# Patient Record
Sex: MALE | URBAN - METROPOLITAN AREA
[De-identification: names, ages, dates, MRNs, and addresses within clinical notes are randomized per-mention and may not be internally consistent; named-entity substitution may affect disease eponyms.]

---

## 2020-10-05 ENCOUNTER — OFFICE VISIT (OUTPATIENT)
Dept: URBAN - METROPOLITAN AREA CLINIC 76 | Facility: CLINIC | Age: 80
End: 2020-10-05
Payer: COMMERCIAL

## 2020-10-05 DIAGNOSIS — H26.493 OTHER SECONDARY CATARACT, BILATERAL: Primary | ICD-10-CM

## 2020-10-05 PROCEDURE — 99204 OFFICE O/P NEW MOD 45 MIN: CPT | Performed by: OPTOMETRIST

## 2020-10-05 ASSESSMENT — KERATOMETRY
OS: 46.75
OD: 46.38

## 2020-10-05 ASSESSMENT — INTRAOCULAR PRESSURE
OD: 11
OS: 11

## 2020-10-05 ASSESSMENT — VISUAL ACUITY
OD: 20/40
OS: 20/50

## 2020-10-05 NOTE — IMPRESSION/PLAN
Impression: Other secondary cataract, bilateral: H26.493. Bilateral. OS>OD Plan: Discussed diagnosis with patient and treatment options. Recommend YAG consult with Dr. Loren Vang.

## 2020-10-30 ENCOUNTER — OFFICE VISIT (OUTPATIENT)
Dept: URBAN - METROPOLITAN AREA CLINIC 76 | Facility: CLINIC | Age: 80
End: 2020-10-30
Payer: COMMERCIAL

## 2020-10-30 DIAGNOSIS — Z96.1 PRESENCE OF INTRAOCULAR LENS: ICD-10-CM

## 2020-10-30 DIAGNOSIS — H52.4 PRESBYOPIA: ICD-10-CM

## 2020-10-30 PROCEDURE — 92002 INTRM OPH EXAM NEW PATIENT: CPT | Performed by: OPHTHALMOLOGY

## 2020-10-30 ASSESSMENT — INTRAOCULAR PRESSURE
OD: 11
OS: 11

## 2020-10-30 ASSESSMENT — KERATOMETRY
OS: 47.00
OD: 46.25

## 2020-10-30 NOTE — IMPRESSION/PLAN
Impression: Other secondary cataract, bilateral: H26.493. Plan: Discussed diagnosis in detail with patient. Discussed treatment options with patient. R/B/A of yag cap discussed, pt would like to proceed. Yag OS then OD ordered. RL2.

## 2020-12-17 ENCOUNTER — SURGERY (OUTPATIENT)
Dept: URBAN - METROPOLITAN AREA SURGERY 47 | Facility: SURGERY | Age: 80
End: 2020-12-17
Payer: COMMERCIAL

## 2020-12-17 PROCEDURE — 66821 AFTER CATARACT LASER SURGERY: CPT | Performed by: OPHTHALMOLOGY

## 2020-12-23 ENCOUNTER — OFFICE VISIT (OUTPATIENT)
Dept: URBAN - METROPOLITAN AREA CLINIC 76 | Facility: CLINIC | Age: 80
End: 2020-12-23
Payer: COMMERCIAL

## 2020-12-23 DIAGNOSIS — Z48.810 ENCOUNTER FOR SURGICAL AFTERCARE FOLLOWING SURGERY ON A SENSE ORGAN: Primary | ICD-10-CM

## 2020-12-23 PROCEDURE — 99024 POSTOP FOLLOW-UP VISIT: CPT | Performed by: OPTOMETRIST

## 2020-12-23 ASSESSMENT — VISUAL ACUITY
OD: 20/40
OS: 20/20

## 2020-12-23 ASSESSMENT — INTRAOCULAR PRESSURE
OD: 11
OS: 11

## 2020-12-23 NOTE — IMPRESSION/PLAN
Impression: S/P YAG Capsulotomy (Yttrium Aluminum [place]) OS - 6 Days. Encounter for surgical aftercare following surgery on a sense organ  Z48.810. Excellent post op course   Post operative instructions reviewed - Plan: Discussed with patient. Hold off on new mrx until YAG OD 12/30. Continue AT's QID OU.

## 2020-12-30 ENCOUNTER — SURGERY (OUTPATIENT)
Dept: URBAN - METROPOLITAN AREA SURGERY 47 | Facility: SURGERY | Age: 80
End: 2020-12-30
Payer: COMMERCIAL

## 2020-12-30 DIAGNOSIS — H26.491 OTHER SECONDARY CATARACT, RIGHT EYE: Primary | ICD-10-CM

## 2020-12-30 PROCEDURE — 66821 AFTER CATARACT LASER SURGERY: CPT | Performed by: OPHTHALMOLOGY

## 2021-01-11 ENCOUNTER — POST-OPERATIVE VISIT (OUTPATIENT)
Dept: URBAN - METROPOLITAN AREA CLINIC 76 | Facility: CLINIC | Age: 81
End: 2021-01-11
Payer: COMMERCIAL

## 2021-01-11 PROCEDURE — 99024 POSTOP FOLLOW-UP VISIT: CPT | Performed by: OPTOMETRIST

## 2021-01-11 ASSESSMENT — VISUAL ACUITY
OS: 20/20-
OD: 20/30--

## 2021-01-11 ASSESSMENT — INTRAOCULAR PRESSURE
OS: 11
OD: 12

## 2021-01-11 NOTE — IMPRESSION/PLAN
Impression: S/P YAG - posterior capsulotomy OD - 12 Days. Encounter for surgical aftercare following surgery on a sense organ  Z48.810. MAC OCT done today WNL OU. Plan: Recommend using AT's 3-4x for comfort. New mrx given today. Pt to call with any concerns.